# Patient Record
Sex: MALE | Race: WHITE | NOT HISPANIC OR LATINO | Employment: OTHER | URBAN - NONMETROPOLITAN AREA
[De-identification: names, ages, dates, MRNs, and addresses within clinical notes are randomized per-mention and may not be internally consistent; named-entity substitution may affect disease eponyms.]

---

## 2022-01-01 ENCOUNTER — HOSPITAL ENCOUNTER (EMERGENCY)
Facility: HOSPITAL | Age: 77
Discharge: HOME | End: 2022-11-28
Attending: NURSE PRACTITIONER | Admitting: NURSE PRACTITIONER
Payer: COMMERCIAL

## 2022-01-01 VITALS
HEART RATE: 70 BPM | OXYGEN SATURATION: 97 % | BODY MASS INDEX: 23.72 KG/M2 | SYSTOLIC BLOOD PRESSURE: 129 MMHG | RESPIRATION RATE: 16 BRPM | WEIGHT: 165.34 LBS | TEMPERATURE: 98 F | DIASTOLIC BLOOD PRESSURE: 78 MMHG

## 2022-01-01 DIAGNOSIS — R07.81 RIB PAIN ON LEFT SIDE: ICD-10-CM

## 2022-01-01 DIAGNOSIS — N30.01 ACUTE CYSTITIS WITH HEMATURIA: Primary | ICD-10-CM

## 2022-01-01 LAB
ALBUMIN UR-MCNC: NEGATIVE MG/DL
APPEARANCE UR: ABNORMAL
BACTERIA #/AREA URNS HPF: ABNORMAL /HPF
BACTERIA UR CULT: ABNORMAL
BACTERIA UR CULT: ABNORMAL
BILIRUB UR QL STRIP: NEGATIVE
COLOR UR AUTO: ABNORMAL
GLUCOSE UR STRIP-MCNC: NEGATIVE MG/DL
HGB UR QL STRIP: ABNORMAL
KETONES UR STRIP-MCNC: NEGATIVE MG/DL
LEUKOCYTE ESTERASE UR QL STRIP: ABNORMAL
MUCOUS THREADS #/AREA URNS LPF: PRESENT /LPF
NITRATE UR QL: NEGATIVE
PH UR STRIP: 5.5 [PH] (ref 4.7–8)
RBC URINE: 9 /HPF
SP GR UR STRIP: 1.01 (ref 1–1.03)
SQUAMOUS EPITHELIAL: 0 /HPF
UROBILINOGEN UR STRIP-MCNC: NORMAL MG/DL
WBC CLUMPS #/AREA URNS HPF: PRESENT /HPF
WBC URINE: >182 /HPF

## 2022-01-01 PROCEDURE — 81001 URINALYSIS AUTO W/SCOPE: CPT | Performed by: FAMILY MEDICINE

## 2022-01-01 PROCEDURE — 81001 URINALYSIS AUTO W/SCOPE: CPT | Performed by: NURSE PRACTITIONER

## 2022-01-01 PROCEDURE — G0463 HOSPITAL OUTPT CLINIC VISIT: HCPCS

## 2022-01-01 PROCEDURE — 99213 OFFICE O/P EST LOW 20 MIN: CPT | Performed by: NURSE PRACTITIONER

## 2022-01-01 PROCEDURE — 87086 URINE CULTURE/COLONY COUNT: CPT | Performed by: NURSE PRACTITIONER

## 2022-01-01 RX ORDER — CEFDINIR 300 MG/1
300 CAPSULE ORAL 2 TIMES DAILY
Qty: 20 CAPSULE | Refills: 0 | Status: SHIPPED | OUTPATIENT
Start: 2022-01-01 | End: 2022-01-01

## 2022-01-01 ASSESSMENT — ENCOUNTER SYMPTOMS
ABDOMINAL PAIN: 0
MYALGIAS: 1
VOMITING: 0
DIARRHEA: 0
DYSURIA: 1
FLANK PAIN: 0
FEVER: 0
SHORTNESS OF BREATH: 0
CHILLS: 0
HEMATURIA: 0
NAUSEA: 0

## 2022-04-07 ENCOUNTER — HOSPITAL ENCOUNTER (EMERGENCY)
Facility: HOSPITAL | Age: 77
Discharge: HOME | End: 2022-04-07
Attending: NURSE PRACTITIONER | Admitting: NURSE PRACTITIONER
Payer: COMMERCIAL

## 2022-04-07 VITALS
SYSTOLIC BLOOD PRESSURE: 125 MMHG | TEMPERATURE: 97.1 F | OXYGEN SATURATION: 98 % | HEART RATE: 64 BPM | RESPIRATION RATE: 14 BRPM | DIASTOLIC BLOOD PRESSURE: 91 MMHG

## 2022-04-07 DIAGNOSIS — M54.9 UPPER BACK PAIN: ICD-10-CM

## 2022-04-07 DIAGNOSIS — R39.12 WEAK URINARY STREAM: ICD-10-CM

## 2022-04-07 LAB
ALBUMIN SERPL-MCNC: 3.6 G/DL (ref 3.4–5)
ALBUMIN UR-MCNC: NEGATIVE MG/DL
ALP SERPL-CCNC: 124 U/L (ref 40–150)
ALT SERPL W P-5'-P-CCNC: 32 U/L (ref 0–70)
ANION GAP SERPL CALCULATED.3IONS-SCNC: 1 MMOL/L (ref 3–14)
APPEARANCE UR: CLEAR
AST SERPL W P-5'-P-CCNC: 21 U/L (ref 0–45)
BASOPHILS # BLD AUTO: 0 10E3/UL (ref 0–0.2)
BASOPHILS NFR BLD AUTO: 1 %
BILIRUB DIRECT SERPL-MCNC: 0.2 MG/DL (ref 0–0.2)
BILIRUB SERPL-MCNC: 0.6 MG/DL (ref 0.2–1.3)
BILIRUB UR QL STRIP: NEGATIVE
BUN SERPL-MCNC: 14 MG/DL (ref 7–30)
CALCIUM SERPL-MCNC: 10.2 MG/DL (ref 8.5–10.1)
CHLORIDE BLD-SCNC: 108 MMOL/L (ref 94–109)
CO2 SERPL-SCNC: 30 MMOL/L (ref 20–32)
COLOR UR AUTO: NORMAL
CREAT SERPL-MCNC: 0.73 MG/DL (ref 0.66–1.25)
EOSINOPHIL # BLD AUTO: 0.1 10E3/UL (ref 0–0.7)
EOSINOPHIL NFR BLD AUTO: 2 %
ERYTHROCYTE [DISTWIDTH] IN BLOOD BY AUTOMATED COUNT: 14.3 % (ref 10–15)
GFR SERPL CREATININE-BSD FRML MDRD: >90 ML/MIN/1.73M2
GLUCOSE BLD-MCNC: 109 MG/DL (ref 70–99)
GLUCOSE UR STRIP-MCNC: NEGATIVE MG/DL
HCT VFR BLD AUTO: 46.5 % (ref 40–53)
HGB BLD-MCNC: 15.4 G/DL (ref 13.3–17.7)
HGB UR QL STRIP: NEGATIVE
HOLD SPECIMEN: NORMAL
IMM GRANULOCYTES # BLD: 0 10E3/UL
IMM GRANULOCYTES NFR BLD: 0 %
KETONES UR STRIP-MCNC: NEGATIVE MG/DL
LEUKOCYTE ESTERASE UR QL STRIP: NEGATIVE
LYMPHOCYTES # BLD AUTO: 1.8 10E3/UL (ref 0.8–5.3)
LYMPHOCYTES NFR BLD AUTO: 34 %
MCH RBC QN AUTO: 31 PG (ref 26.5–33)
MCHC RBC AUTO-ENTMCNC: 33.1 G/DL (ref 31.5–36.5)
MCV RBC AUTO: 94 FL (ref 78–100)
MONOCYTES # BLD AUTO: 0.5 10E3/UL (ref 0–1.3)
MONOCYTES NFR BLD AUTO: 10 %
NEUTROPHILS # BLD AUTO: 2.8 10E3/UL (ref 1.6–8.3)
NEUTROPHILS NFR BLD AUTO: 53 %
NITRATE UR QL: NEGATIVE
NRBC # BLD AUTO: 0 10E3/UL
NRBC BLD AUTO-RTO: 0 /100
PH UR STRIP: 7 [PH] (ref 4.7–8)
PLATELET # BLD AUTO: 137 10E3/UL (ref 150–450)
POTASSIUM BLD-SCNC: 4.5 MMOL/L (ref 3.4–5.3)
PROT SERPL-MCNC: 7.2 G/DL (ref 6.8–8.8)
RBC # BLD AUTO: 4.97 10E6/UL (ref 4.4–5.9)
RBC URINE: 0 /HPF
SODIUM SERPL-SCNC: 139 MMOL/L (ref 133–144)
SP GR UR STRIP: 1.01 (ref 1–1.03)
SQUAMOUS EPITHELIAL: 0 /HPF
TROPONIN I SERPL HS-MCNC: 10 NG/L
UROBILINOGEN UR STRIP-MCNC: NORMAL MG/DL
WBC # BLD AUTO: 5.3 10E3/UL (ref 4–11)
WBC URINE: 1 /HPF

## 2022-04-07 PROCEDURE — 99284 EMERGENCY DEPT VISIT MOD MDM: CPT

## 2022-04-07 PROCEDURE — 84153 ASSAY OF PSA TOTAL: CPT | Performed by: NURSE PRACTITIONER

## 2022-04-07 PROCEDURE — 99284 EMERGENCY DEPT VISIT MOD MDM: CPT | Performed by: NURSE PRACTITIONER

## 2022-04-07 PROCEDURE — 36415 COLL VENOUS BLD VENIPUNCTURE: CPT | Performed by: STUDENT IN AN ORGANIZED HEALTH CARE EDUCATION/TRAINING PROGRAM

## 2022-04-07 PROCEDURE — 84484 ASSAY OF TROPONIN QUANT: CPT | Performed by: NURSE PRACTITIONER

## 2022-04-07 PROCEDURE — 82310 ASSAY OF CALCIUM: CPT | Performed by: STUDENT IN AN ORGANIZED HEALTH CARE EDUCATION/TRAINING PROGRAM

## 2022-04-07 PROCEDURE — 85004 AUTOMATED DIFF WBC COUNT: CPT | Performed by: STUDENT IN AN ORGANIZED HEALTH CARE EDUCATION/TRAINING PROGRAM

## 2022-04-07 PROCEDURE — 82248 BILIRUBIN DIRECT: CPT | Performed by: STUDENT IN AN ORGANIZED HEALTH CARE EDUCATION/TRAINING PROGRAM

## 2022-04-07 PROCEDURE — 93010 ELECTROCARDIOGRAM REPORT: CPT | Performed by: INTERNAL MEDICINE

## 2022-04-07 PROCEDURE — 93005 ELECTROCARDIOGRAM TRACING: CPT

## 2022-04-07 PROCEDURE — 81001 URINALYSIS AUTO W/SCOPE: CPT | Performed by: STUDENT IN AN ORGANIZED HEALTH CARE EDUCATION/TRAINING PROGRAM

## 2022-04-07 ASSESSMENT — ENCOUNTER SYMPTOMS
ENDOCRINE NEGATIVE: 1
MUSCULOSKELETAL NEGATIVE: 1
EYES NEGATIVE: 1
HEMATOLOGIC/LYMPHATIC NEGATIVE: 1
ABDOMINAL PAIN: 1
NEUROLOGICAL NEGATIVE: 1
DIFFICULTY URINATING: 1
FREQUENCY: 1
ALLERGIC/IMMUNOLOGIC NEGATIVE: 1
CONSTITUTIONAL NEGATIVE: 1
PSYCHIATRIC NEGATIVE: 1
RESPIRATORY NEGATIVE: 1
CARDIOVASCULAR NEGATIVE: 1

## 2022-04-07 NOTE — DISCHARGE INSTRUCTIONS
Thank you for choosing [facility] for your healthcare needs today.  For your weak urinary stream, as I discussed your urine is not infectious, you are able to empty your bladder completely as the bladder scanner had 0 residual when you have your tested.  You will need to follow-up with your urologist that you had seen a couple years ago for further evaluation.  You need to make an appointment with a primary care provider to have a yearly exam done and your prostrate checked.  You need to decrease your coffee intake as you tell me that that is all you drink and increase water intake.  For your back pain use Tylenol or ibuprofen.  If your symptoms worsen or he develop any new concerning symptoms please return to ER.  Thank you

## 2022-04-07 NOTE — ED PROVIDER NOTES
"  History     Chief Complaint   Patient presents with     Abdominal Pain     HPI   History of presenting illness given by patient.    Sarbjit Rivera is a 77 year old male who presents to emergency room for evaluation of difficulty urinating, low abdominal pain, high blood pressure, back pain that is between his shoulder blades.  His back pain that is located in between the shoulder blades started 2 days ago.  His bladder urinary problems have been ongoing for over 2 years and his urologist at that time had told him he just has a \"sleepy bladder\" and placed him on some pills, but after short while he threw the pills away as he did not like being on them.  He decided to be seen for this today because his blood pressure is never this high and he feels that the back pain, the blood pressure, and the urinary difficulty is all related.  He rates his back pain and lower abdominal pressure of 5/10.    Allergies:  Allergies   Allergen Reactions     Statins [Hmg-Coa-R Inhibitors]      Tylenol [Acetaminophen]        Problem List:    There are no problems to display for this patient.       Past Medical History:    No past medical history on file.    Past Surgical History:    No past surgical history on file.    Family History:    No family history on file.    Social History:  Marital Status:  Single [1]  Social History     Tobacco Use     Smoking status: Current Some Day Smoker   Substance Use Topics     Alcohol use: Yes     Comment: occasional     Drug use: No        Medications:    AMLODIPINE BESYLATE PO  aspirin 81 MG tablet  colchicine 0.6 MG tablet  ketorolac (TORADOL) 10 MG tablet  METOPROLOL TARTRATE PO  Rosuvastatin Calcium (CRESTOR PO)          Review of Systems   Constitutional: Negative.    HENT: Negative.    Eyes: Negative.    Respiratory: Negative.    Cardiovascular: Negative.    Gastrointestinal: Positive for abdominal pain.   Endocrine: Negative.    Genitourinary: Positive for difficulty urinating, frequency " and urgency.   Musculoskeletal: Negative.    Skin: Negative.    Allergic/Immunologic: Negative.    Neurological: Negative.    Hematological: Negative.    Psychiatric/Behavioral: Negative.        Physical Exam   BP: 151/90  Pulse: 74  Temp: 97.1  F (36.2  C)  Resp: 14  SpO2: 97 %      Physical Exam  Vitals and nursing note reviewed.   Constitutional:       Appearance: He is well-developed and normal weight.   HENT:      Head: Normocephalic.      Mouth/Throat:      Mouth: Mucous membranes are moist.      Pharynx: Oropharynx is clear.   Eyes:      Extraocular Movements: Extraocular movements intact.      Pupils: Pupils are equal, round, and reactive to light.   Cardiovascular:      Rate and Rhythm: Normal rate and regular rhythm.      Heart sounds: Normal heart sounds.   Pulmonary:      Effort: Pulmonary effort is normal.      Breath sounds: Normal breath sounds.   Abdominal:      General: Abdomen is flat. Bowel sounds are normal.      Palpations: Abdomen is soft.      Tenderness: There is abdominal tenderness in the suprapubic area.   Skin:     General: Skin is warm and dry.   Neurological:      General: No focal deficit present.      Mental Status: He is alert and oriented to person, place, and time.   Psychiatric:         Mood and Affect: Mood normal.         Behavior: Behavior normal.         ED Course     MDM: Differential diagnosis includes and was not limited to: Acute coronary syndrome, abdominal aortic aneurysm, acute cystitis, pyelonephritis, urethritis, prostatitis, nephrolithiasis, infected nephrolithiasis, epididymitis.       EKG Interpretation:      Interpreted by DESIREE Wayne CNP  Time reviewed: 14:30  Symptoms at time of EKG: back pain  Rhythm: sinus bradycardia  Rate: 59  Axis: Left axis deviation  Ectopy: none  Conduction: normal  ST Segments/ T Waves: No ST-T wave changes  Q Waves: none  Comparison to prior: UNCHANGED    Clinical Impression: normal EKG        No results found for this or any  previous visit (from the past 24 hour(s)).    Medications - No data to display    Assessments & Plan (with Medical Decision Making)   Findings as above.  On assessment patient is negative for abdominal pain or tenderness with palpation to all 4 quadrants.  Normal bowel sounds in all 4 quadrants.  Negative CVA tenderness.  Lungs are clear throughout.  Patient denies worsening back pain with deep inspiration.    Labs: CBC, CMP, troponin, and UA are completely normal, so I do not suspect acute cystitis, nephrolithiasis, or infected nephrolithiasis, acute coronary syndrome, abdomen abdominal aortic aneurysm, pyelonephritis, urethritis, prostatitis, or epididymitis.     Discharge plan: I reviewed all lab results with patient and that everything was normal.  I discussed his PSA and that he needs to follow-up with his primary care provider or his urologist that had recently placed him on medications for his bladder difficulties.  He will follow-up with his primary care provider.  He has been instructed if his symptoms worsen or he develops any new concerning symptoms he will return.          I have reviewed the nursing notes.    I have reviewed the findings, diagnosis, plan and need for follow up with the patient.      Discharge Medication List as of 4/7/2022  3:08 PM          Final diagnoses:   Weak urinary stream   Upper back pain       4/7/2022   [facility]     Divina Page APRN CNP  04/17/22 [id number]

## 2022-04-07 NOTE — ED TRIAGE NOTES
Pt presents with c/o difficulty urinating, abd pain and htn.  Denies blurry vision, headache, denies CP, SOB. Pt reports foul smelling urine, denies haematuria.

## 2022-04-11 LAB
PSA FREE MFR SERPL: 24 %
PSA FREE SERPL-MCNC: 9.3 NG/ML
PSA SERPL IA-MCNC: 39 NG/ML

## 2022-06-03 ENCOUNTER — HOSPITAL ENCOUNTER (EMERGENCY)
Facility: HOSPITAL | Age: 77
Discharge: HOME | End: 2022-06-03
Attending: STUDENT IN AN ORGANIZED HEALTH CARE EDUCATION/TRAINING PROGRAM | Admitting: STUDENT IN AN ORGANIZED HEALTH CARE EDUCATION/TRAINING PROGRAM
Payer: COMMERCIAL

## 2022-06-03 VITALS
OXYGEN SATURATION: 96 % | BODY MASS INDEX: 22.9 KG/M2 | HEIGHT: 70 IN | TEMPERATURE: 97.7 F | RESPIRATION RATE: 16 BRPM | SYSTOLIC BLOOD PRESSURE: 143 MMHG | HEART RATE: 89 BPM | WEIGHT: 160 LBS | DIASTOLIC BLOOD PRESSURE: 93 MMHG

## 2022-06-03 DIAGNOSIS — N39.0 URINARY TRACT INFECTION WITHOUT HEMATURIA, SITE UNSPECIFIED: ICD-10-CM

## 2022-06-03 DIAGNOSIS — R33.9 URINARY RETENTION: ICD-10-CM

## 2022-06-03 PROBLEM — I20.0 UNSTABLE ANGINA (H): Status: ACTIVE | Noted: 2018-09-21

## 2022-06-03 PROBLEM — I25.10 CORONARY ARTERY DISEASE INVOLVING NATIVE CORONARY ARTERY OF NATIVE HEART WITHOUT ANGINA PECTORIS: Status: ACTIVE | Noted: 2017-03-31

## 2022-06-03 PROBLEM — J06.9 VIRAL URI WITH COUGH: Status: ACTIVE | Noted: 2018-09-22

## 2022-06-03 PROBLEM — I16.0 HYPERTENSIVE URGENCY: Status: ACTIVE | Noted: 2018-09-22

## 2022-06-03 PROBLEM — I72.3 ANEURYSM, COMMON ILIAC ARTERY (H): Status: ACTIVE | Noted: 2017-03-14

## 2022-06-03 PROBLEM — I48.0 PAROXYSMAL ATRIAL FIBRILLATION (H): Status: ACTIVE | Noted: 2017-05-03

## 2022-06-03 PROBLEM — R00.2 PALPITATIONS: Status: ACTIVE | Noted: 2017-03-14

## 2022-06-03 PROBLEM — R55 VASOVAGAL REACTION: Status: ACTIVE | Noted: 2017-03-14

## 2022-06-03 PROBLEM — I25.10 ASCVD (ARTERIOSCLEROTIC CARDIOVASCULAR DISEASE): Status: ACTIVE | Noted: 2019-01-08

## 2022-06-03 LAB
ALBUMIN UR-MCNC: 20 MG/DL
APPEARANCE UR: ABNORMAL
BACTERIA #/AREA URNS HPF: ABNORMAL /HPF
BILIRUB UR QL STRIP: NEGATIVE
COLOR UR AUTO: ABNORMAL
GLUCOSE UR STRIP-MCNC: NEGATIVE MG/DL
HGB UR QL STRIP: ABNORMAL
HYALINE CASTS: 4 /LPF
KETONES UR STRIP-MCNC: NEGATIVE MG/DL
LEUKOCYTE ESTERASE UR QL STRIP: ABNORMAL
MUCOUS THREADS #/AREA URNS LPF: PRESENT /LPF
NITRATE UR QL: POSITIVE
PH UR STRIP: 5.5 [PH] (ref 4.7–8)
RBC URINE: 13 /HPF
SP GR UR STRIP: 1.01 (ref 1–1.03)
SQUAMOUS EPITHELIAL: 0 /HPF
UROBILINOGEN UR STRIP-MCNC: NORMAL MG/DL
WBC URINE: 46 /HPF

## 2022-06-03 PROCEDURE — 81001 URINALYSIS AUTO W/SCOPE: CPT | Performed by: STUDENT IN AN ORGANIZED HEALTH CARE EDUCATION/TRAINING PROGRAM

## 2022-06-03 PROCEDURE — 87086 URINE CULTURE/COLONY COUNT: CPT | Performed by: STUDENT IN AN ORGANIZED HEALTH CARE EDUCATION/TRAINING PROGRAM

## 2022-06-03 PROCEDURE — 99283 EMERGENCY DEPT VISIT LOW MDM: CPT | Performed by: STUDENT IN AN ORGANIZED HEALTH CARE EDUCATION/TRAINING PROGRAM

## 2022-06-03 PROCEDURE — 99283 EMERGENCY DEPT VISIT LOW MDM: CPT

## 2022-06-03 RX ORDER — AMLODIPINE AND BENAZEPRIL HYDROCHLORIDE 10; 20 MG/1; MG/1
1 CAPSULE ORAL DAILY
COMMUNITY
Start: 2021-08-12

## 2022-06-03 RX ORDER — SOTALOL HYDROCHLORIDE 80 MG/1
80 TABLET ORAL
COMMUNITY
Start: 2021-08-12

## 2022-06-03 RX ORDER — NITROGLYCERIN 0.4 MG/1
0.4 TABLET SUBLINGUAL
COMMUNITY
Start: 2021-08-12

## 2022-06-03 RX ORDER — ATORVASTATIN CALCIUM 20 MG/1
20 TABLET, FILM COATED ORAL DAILY
COMMUNITY
Start: 2021-08-12

## 2022-06-03 RX ORDER — CALCIUM CARBONATE 300MG(750)
400 TABLET,CHEWABLE ORAL
COMMUNITY
Start: 2021-08-12 | End: 2023-01-01

## 2022-06-03 RX ORDER — CEPHALEXIN 500 MG/1
500 CAPSULE ORAL 3 TIMES DAILY
Qty: 21 CAPSULE | Refills: 0 | Status: SHIPPED | OUTPATIENT
Start: 2022-06-03 | End: 2022-06-10

## 2022-06-03 NOTE — ED TRIAGE NOTES
Patient ambulatory to ED room 2. Patient reports that he had a gallagher catheter removed yesterday in clinic. Patient was able to void initially, but has been unable to void since about 2100 last night. Patient reports bladder pressure/discomfort. Patient first had gallagher placed on 5/28/22 at the [facility].

## 2022-06-03 NOTE — DISCHARGE INSTRUCTIONS
- Please return to the Emergency Room if you do not improve, feel worse, or have any new or concerning symptoms. We would especially want to see you back if you experience a fever.  - Please follow up with a urologist by phone within 12 hours and in person within 7-10 days.

## 2022-06-03 NOTE — ED PROVIDER NOTES
History     Chief Complaint   Patient presents with     Urinary Retention     Had catheter removed yesterday, unable to void since 2100 last night     HPI  Sarbjit Rivera is a 77 year old male with PMH urinary retention s/p gallagher removal yesterday, now unable to urinate. Has hx of GERD as well. Feels some fullness in the lower abdomen but no pain. No fevers. Had gallagher in for around 4-5 days and taken out by urologist yesterday. No N/V/D. No CP/SOB.    Allergies:  Allergies   Allergen Reactions     Oxybutynin Swelling     Tongue swelling     Statins [Hmg-Coa-R Inhibitors]      Tylenol [Acetaminophen]        Problem List:    Patient Active Problem List    Diagnosis Date Noted     ASCVD (arteriosclerotic cardiovascular disease) 01/08/2019     Priority: Medium     Hypertensive urgency 09/22/2018     Priority: Medium     Viral URI with cough 09/22/2018     Priority: Medium     Unstable angina (H) 09/21/2018     Priority: Medium     Paroxysmal atrial fibrillation (H) 05/03/2017     Priority: Medium     Coronary artery disease involving native coronary artery of native heart without angina pectoris 03/31/2017     Priority: Medium     Aneurysm, common iliac artery (H) 03/14/2017     Priority: Medium     Palpitations 03/14/2017     Priority: Medium     Vasovagal reaction 03/14/2017     Priority: Medium     Jaw pain 09/21/2014     Priority: Medium     Actinic keratosis 01/23/2014     Priority: Medium     Basal cell carcinoma of eyelid 01/23/2014     Priority: Medium     Formatting of this note might be different from the original.  IMO Update       Personal history of other malignant neoplasm of skin 01/23/2014     Priority: Medium     Anxiety 11/24/2012     Priority: Medium     Chest pain 11/24/2012     Priority: Medium     GERD (gastroesophageal reflux disease) 11/24/2012     Priority: Medium     HTN (hypertension) 11/24/2012     Priority: Medium     Pancreatic cyst 11/24/2012     Priority: Medium     Pain in joint,  "shoulder region 06/02/2009     Priority: Medium     Formatting of this note might be different from the original.  IMO Update 10/11          Past Medical History:    History reviewed. No pertinent past medical history.    Past Surgical History:    History reviewed. No pertinent surgical history.    Family History:    History reviewed. No pertinent family history.    Social History:  Marital Status:  Single [1]  Social History     Tobacco Use     Smoking status: Current Some Day Smoker   Substance Use Topics     Alcohol use: Yes     Comment: occasional     Drug use: No        Medications:    AMLODIPINE BESYLATE PO  amLODIPine-benazepril (LOTREL) 10-20 MG capsule  aspirin 81 MG tablet  atorvastatin (LIPITOR) 20 MG tablet  cephALEXin (KEFLEX) 500 MG capsule  Magnesium 400 MG TABS  nitroGLYcerin (NITROSTAT) 0.4 MG sublingual tablet  sotalol (BETAPACE) 80 MG tablet  ketorolac (TORADOL) 10 MG tablet  Rosuvastatin Calcium (CRESTOR PO)          Review of Systems   All other systems reviewed and are negative.      Physical Exam   BP: 143/93  Pulse: 89  Temp: 97.7  F (36.5  C)  Resp: 16  Height: 177.8 cm (5' 10\")  Weight: 72.6 kg (160 lb)  SpO2: 96 %      Physical Exam  Vitals and nursing note reviewed.   Constitutional:       General: He is not in acute distress.     Appearance: Normal appearance. He is not ill-appearing or toxic-appearing.   HENT:      Head: Normocephalic and atraumatic.      Right Ear: External ear normal.      Left Ear: External ear normal.   Eyes:      Pupils: Pupils are equal, round, and reactive to light.   Cardiovascular:      Rate and Rhythm: Normal rate and regular rhythm.      Pulses: Normal pulses.      Heart sounds: Normal heart sounds.   Pulmonary:      Effort: Pulmonary effort is normal.   Abdominal:      General: Abdomen is flat.      Palpations: Abdomen is soft.      Tenderness: There is no abdominal tenderness. There is no guarding.   Musculoskeletal:         General: No swelling or " tenderness. Normal range of motion.      Cervical back: Normal range of motion and neck supple.   Skin:     General: Skin is warm and dry.      Capillary Refill: Capillary refill takes less than 2 seconds.   Neurological:      General: No focal deficit present.      Mental Status: He is alert and oriented to person, place, and time.   Psychiatric:         Mood and Affect: Mood normal.         Behavior: Behavior normal.         ED Course        ED Course as of 06/03/22 0727 Fri Jun 03, 2022   0724 UA with Microscopic reflex to Culture(!)  Consistent with UTI. Will treat.     Findings were discussed with the patient. Additional verbal instructions were discussed with the patient as well. Instructed to follow up with a primary care provider within urology. Also discussed specific warning signs and instructed to return to the ED if there are any concerns. Patient voiced understanding of instructions, questions were answered and the patient was discharged home in stable condition.    Procedures              Results for orders placed or performed during the hospital encounter of 06/03/22 (from the past 24 hour(s))   UA with Microscopic reflex to Culture    Specimen: Urine, Catheter   Result Value Ref Range    Color Urine Light Yellow Colorless, Straw, Light Yellow, Yellow    Appearance Urine Slightly Cloudy (A) Clear    Glucose Urine Negative Negative mg/dL    Bilirubin Urine Negative Negative    Ketones Urine Negative Negative mg/dL    Specific Gravity Urine 1.014 1.003 - 1.035    Blood Urine Small (A) Negative    pH Urine 5.5 4.7 - 8.0    Protein Albumin Urine 20  (A) Negative mg/dL    Urobilinogen Urine Normal Normal, 2.0 mg/dL    Nitrite Urine Positive (A) Negative    Leukocyte Esterase Urine Moderate (A) Negative    Bacteria Urine Many (A) None Seen /HPF    Mucus Urine Present (A) None Seen /LPF    RBC Urine 13 (H) <=2 /HPF    WBC Urine 46 (H) <=5 /HPF    Squamous Epithelials Urine 0 <=1 /HPF    Hyaline Casts Urine  4 (H) <=2 /LPF    Narrative    Urine Culture ordered based on laboratory criteria       Medications - No data to display    Assessments & Plan (with Medical Decision Making)     I have reviewed the nursing notes.    Urinary retention since gallagher removal yesterday. Has not been able to void. Bladder scan shows high amount of urine in bladder (approx 560). Will plan on repeat gallagher. No indication for renal function testing. Will check UA out of abundance of caution but low suspicion for UTI.    See ED Course.    I have reviewed the findings, diagnosis, plan and need for follow up with the patient.    New Prescriptions    CEPHALEXIN (KEFLEX) 500 MG CAPSULE    Take 1 capsule (500 mg) by mouth 3 times daily for 7 days       Final diagnoses:   Urinary retention   Urinary tract infection without hematuria, site unspecified       6/3/2022   [facility]     Ulices Wall MD  06/03/22 0349

## 2022-06-03 NOTE — ED NOTES
Discharge instructions reviewed with patient.  rx e-scribed to pharmacy of choice.  No questions or concerns.

## 2022-06-05 LAB — BACTERIA UR CULT: ABNORMAL

## 2022-08-01 ENCOUNTER — HOSPITAL ENCOUNTER (EMERGENCY)
Facility: HOSPITAL | Age: 77
Discharge: HOME | End: 2022-08-01
Attending: NURSE PRACTITIONER | Admitting: NURSE PRACTITIONER
Payer: COMMERCIAL

## 2022-08-01 VITALS
OXYGEN SATURATION: 97 % | DIASTOLIC BLOOD PRESSURE: 73 MMHG | TEMPERATURE: 97 F | HEART RATE: 60 BPM | RESPIRATION RATE: 16 BRPM | SYSTOLIC BLOOD PRESSURE: 120 MMHG

## 2022-08-01 DIAGNOSIS — Z46.6 URINARY CATHETER CHANGE REQUIRED: ICD-10-CM

## 2022-08-01 PROCEDURE — 99213 OFFICE O/P EST LOW 20 MIN: CPT | Performed by: NURSE PRACTITIONER

## 2022-08-01 PROCEDURE — G0463 HOSPITAL OUTPT CLINIC VISIT: HCPCS | Mod: 25

## 2022-08-01 PROCEDURE — G0463 HOSPITAL OUTPT CLINIC VISIT: HCPCS

## 2022-08-01 RX ORDER — LIDOCAINE HYDROCHLORIDE 20 MG/ML
JELLY TOPICAL ONCE
Status: DISCONTINUED | OUTPATIENT
Start: 2022-08-01 | End: 2022-08-01 | Stop reason: HOSPADM

## 2022-08-01 RX ORDER — ALPRAZOLAM 0.25 MG
0.25 TABLET ORAL 3 TIMES DAILY PRN
Qty: 12 TABLET | Refills: 0 | Status: SHIPPED | OUTPATIENT
Start: 2022-08-01 | End: 2022-08-01

## 2022-08-01 RX ORDER — ALPRAZOLAM 0.25 MG
0.25 TABLET ORAL 2 TIMES DAILY PRN
Qty: 12 TABLET | Refills: 0 | Status: SHIPPED | OUTPATIENT
Start: 2022-08-01 | End: 2022-08-01

## 2022-08-01 RX ORDER — ALPRAZOLAM 0.25 MG
0.25 TABLET ORAL 3 TIMES DAILY PRN
Qty: 12 TABLET | Refills: 0 | Status: SHIPPED | OUTPATIENT
Start: 2022-08-01 | End: 2023-01-01

## 2022-08-01 ASSESSMENT — ENCOUNTER SYMPTOMS
MYALGIAS: 1
PSYCHIATRIC NEGATIVE: 1
ARTHRALGIAS: 1
NEUROLOGICAL NEGATIVE: 1
HEMATURIA: 0

## 2022-08-01 NOTE — ED NOTES
16 [place] Hammer inserted without difficulty. 150cc pink urine emptied from old catheter leg bag.

## 2022-08-01 NOTE — ED TRIAGE NOTES
Pt presents with no urine in his leg catheter bag. States that he has been having bladder spasms today. States that he did not take his aspirin today because is going to have surgery on 8-.

## 2022-08-01 NOTE — DISCHARGE INSTRUCTIONS
New 16 Malay catheter inserted.   Drink plenty of fluids  Xanax 0.25 tab up to 2 times a day for spasm in bladder.

## 2022-08-01 NOTE — ED PROVIDER NOTES
History     Chief Complaint   Patient presents with     Catheter Problem     HPI  Sarbjit Rivera is a 77 year old male who has gallagher cath in  Has not been draining fully, so comes in for catheter change. Hx prostate cancer and has had some bladder spasm with the cath in,.  Off ASA, Has surgery on 8/12/22 (TURP)       Allergies:  Allergies   Allergen Reactions     Oxybutynin Swelling     Tongue swelling     Statins [Hmg-Coa-R Inhibitors]      Tylenol [Acetaminophen]        Problem List:    Patient Active Problem List    Diagnosis Date Noted     ASCVD (arteriosclerotic cardiovascular disease) 01/08/2019     Priority: Medium     Hypertensive urgency 09/22/2018     Priority: Medium     Viral URI with cough 09/22/2018     Priority: Medium     Unstable angina (H) 09/21/2018     Priority: Medium     Paroxysmal atrial fibrillation (H) 05/03/2017     Priority: Medium     Coronary artery disease involving native coronary artery of native heart without angina pectoris 03/31/2017     Priority: Medium     Aneurysm, common iliac artery (H) 03/14/2017     Priority: Medium     Palpitations 03/14/2017     Priority: Medium     Vasovagal reaction 03/14/2017     Priority: Medium     Jaw pain 09/21/2014     Priority: Medium     Actinic keratosis 01/23/2014     Priority: Medium     Basal cell carcinoma of eyelid 01/23/2014     Priority: Medium     Formatting of this note might be different from the original.  IMO Update       Personal history of other malignant neoplasm of skin 01/23/2014     Priority: Medium     Anxiety 11/24/2012     Priority: Medium     Chest pain 11/24/2012     Priority: Medium     GERD (gastroesophageal reflux disease) 11/24/2012     Priority: Medium     HTN (hypertension) 11/24/2012     Priority: Medium     Pancreatic cyst 11/24/2012     Priority: Medium     Pain in joint, shoulder region 06/02/2009     Priority: Medium     Formatting of this note might be different from the original.  IMO Update 10/11           Past Medical History:    No past medical history on file.    Past Surgical History:    No past surgical history on file.    Family History:    No family history on file.    Social History:  Marital Status:  Single [1]  Social History     Tobacco Use     Smoking status: Current Some Day Smoker   Substance Use Topics     Alcohol use: Yes     Comment: occasional     Drug use: No        Medications:    ALPRAZolam (XANAX) 0.25 MG tablet  AMLODIPINE BESYLATE PO  amLODIPine-benazepril (LOTREL) 10-20 MG capsule  aspirin 81 MG tablet  atorvastatin (LIPITOR) 20 MG tablet  Magnesium 400 MG TABS  nitroGLYcerin (NITROSTAT) 0.4 MG sublingual tablet  Rosuvastatin Calcium (CRESTOR PO)  sotalol (BETAPACE) 80 MG tablet          Review of Systems   Genitourinary: Positive for penile pain. Negative for hematuria and penile swelling.        Has catheter that is not draining well     Musculoskeletal: Positive for arthralgias and myalgias.   Skin: Negative.    Neurological: Negative.    Psychiatric/Behavioral: Negative.        Physical Exam   BP: 120/73  Pulse: 60  Temp: 97  F (36.1  C)  Resp: 16  SpO2: 97 %      Physical Exam    ED Course                 Procedures         Catheter  #16g Hammer   placed with 10cc to balloon placed by staff    (2 way with surgical end used as supplies not readily available Surgical end taped.  )       No results found for this or any previous visit (from the past 24 hour(s)).    Medications   lidocaine (XYLOCAINE) 2 % external gel (has no administration in time range)       Assessments & Plan (with Medical Decision Making)     I have reviewed the nursing notes.    I have reviewed the findings, diagnosis, plan and need for follow up with the patient.      New Prescriptions    ALPRAZOLAM (XANAX) 0.25 MG TABLET    Take 1 tablet (0.25 mg) by mouth 2 times daily as needed for anxiety And bladder spasm       Final diagnoses:   Urinary catheter change required     ASSESSMENT / PLAN:  (Z46.6) Urinary catheter  change required  Comment:   Plan:   1. New 16 Setsw[place] catheter inserted.   2. Drink plenty of fluids  3. Xanax 0.25 tab up to 2 times a day for spasm in bladder.      8/1/2022   [facility]     Mahamed Hilario NP  08/01/22 [id number]

## 2022-08-17 ENCOUNTER — HOSPITAL ENCOUNTER (EMERGENCY)
Facility: HOSPITAL | Age: 77
Discharge: HOME | End: 2022-08-17
Attending: EMERGENCY MEDICINE | Admitting: EMERGENCY MEDICINE
Payer: COMMERCIAL

## 2022-08-17 VITALS — SYSTOLIC BLOOD PRESSURE: 129 MMHG | DIASTOLIC BLOOD PRESSURE: 89 MMHG

## 2022-08-17 DIAGNOSIS — R33.9 URINARY RETENTION: ICD-10-CM

## 2022-08-17 DIAGNOSIS — N30.00 ACUTE CYSTITIS WITHOUT HEMATURIA: ICD-10-CM

## 2022-08-17 LAB
ALBUMIN UR-MCNC: 30 MG/DL
APPEARANCE UR: ABNORMAL
BACTERIA #/AREA URNS HPF: ABNORMAL /HPF
BILIRUB UR QL STRIP: NEGATIVE
COLOR UR AUTO: ABNORMAL
GLUCOSE UR STRIP-MCNC: NEGATIVE MG/DL
HGB UR QL STRIP: ABNORMAL
KETONES UR STRIP-MCNC: NEGATIVE MG/DL
LEUKOCYTE ESTERASE UR QL STRIP: ABNORMAL
MUCOUS THREADS #/AREA URNS LPF: PRESENT /LPF
NITRATE UR QL: POSITIVE
PH UR STRIP: 7 [PH] (ref 4.7–8)
RBC URINE: >182 /HPF
SP GR UR STRIP: 1.01 (ref 1–1.03)
SQUAMOUS EPITHELIAL: 0 /HPF
UROBILINOGEN UR STRIP-MCNC: NORMAL MG/DL
WBC URINE: 13 /HPF

## 2022-08-17 PROCEDURE — 87086 URINE CULTURE/COLONY COUNT: CPT | Performed by: EMERGENCY MEDICINE

## 2022-08-17 PROCEDURE — 99283 EMERGENCY DEPT VISIT LOW MDM: CPT | Performed by: EMERGENCY MEDICINE

## 2022-08-17 PROCEDURE — 81001 URINALYSIS AUTO W/SCOPE: CPT | Performed by: EMERGENCY MEDICINE

## 2022-08-17 PROCEDURE — 51702 INSERT TEMP BLADDER CATH: CPT

## 2022-08-17 PROCEDURE — 250N000009 HC RX 250: Performed by: EMERGENCY MEDICINE

## 2022-08-17 PROCEDURE — 99283 EMERGENCY DEPT VISIT LOW MDM: CPT

## 2022-08-17 RX ORDER — NITROFURANTOIN 25; 75 MG/1; MG/1
100 CAPSULE ORAL 2 TIMES DAILY
Qty: 10 CAPSULE | Refills: 0 | Status: SHIPPED | OUTPATIENT
Start: 2022-08-17 | End: 2022-01-01

## 2022-08-17 RX ORDER — LIDOCAINE HYDROCHLORIDE 20 MG/ML
JELLY TOPICAL ONCE
Status: COMPLETED | OUTPATIENT
Start: 2022-08-17 | End: 2022-08-17

## 2022-08-17 RX ORDER — NITROFURANTOIN 25; 75 MG/1; MG/1
100 CAPSULE ORAL 2 TIMES DAILY
Qty: 10 CAPSULE | Refills: 0 | Status: SHIPPED | OUTPATIENT
Start: 2022-08-17 | End: 2022-08-17

## 2022-08-17 RX ADMIN — LIDOCAINE HYDROCHLORIDE: 20 JELLY TOPICAL at 07:30

## 2022-08-17 ASSESSMENT — ENCOUNTER SYMPTOMS
MUSCULOSKELETAL NEGATIVE: 1
ABDOMINAL PAIN: 1
HEMATOLOGIC/LYMPHATIC NEGATIVE: 1
NEUROLOGICAL NEGATIVE: 1
CARDIOVASCULAR NEGATIVE: 1
CONSTITUTIONAL NEGATIVE: 1
DIFFICULTY URINATING: 1
EYES NEGATIVE: 1
RESPIRATORY NEGATIVE: 1

## 2022-08-17 ASSESSMENT — ACTIVITIES OF DAILY LIVING (ADL): ADLS_ACUITY_SCORE: 33

## 2022-08-17 NOTE — ED PROVIDER NOTES
History     Chief Complaint   Patient presents with     Urinary Retention     HPI  Sarbjit Rivera is a 77 year old male who comes to the emergency department complaining of inability to urinate.  Patient states that he had prostate surgery last week.  He recently had his Hammer catheter removed.  He has not been able to urinate for the past several hours and has increasing a lower abdominal discomfort and pressure up.  He denies fevers or chills.  He denies flank pain.  He denies headache.  He denies pain in his chest and denies feeling short of breath.  He states he is not nauseous and he has not vomited.  He has not had hematuria.    Allergies:  Allergies   Allergen Reactions     Acetaminophen Nausea     Oxybutynin Swelling     Tongue swelling     Pravastatin      Disturbed sleep pattern      Statins [Hmg-Coa-R Inhibitors]        Problem List:    Patient Active Problem List    Diagnosis Date Noted     ASCVD (arteriosclerotic cardiovascular disease) 01/08/2019     Priority: Medium     Hypertensive urgency 09/22/2018     Priority: Medium     Viral URI with cough 09/22/2018     Priority: Medium     Unstable angina (H) 09/21/2018     Priority: Medium     Paroxysmal atrial fibrillation (H) 05/03/2017     Priority: Medium     Coronary artery disease involving native coronary artery of native heart without angina pectoris 03/31/2017     Priority: Medium     Aneurysm, common iliac artery (H) 03/14/2017     Priority: Medium     Palpitations 03/14/2017     Priority: Medium     Vasovagal reaction 03/14/2017     Priority: Medium     Jaw pain 09/21/2014     Priority: Medium     Actinic keratosis 01/23/2014     Priority: Medium     Basal cell carcinoma of eyelid 01/23/2014     Priority: Medium     Formatting of this note might be different from the original.  IMO Update       Personal history of other malignant neoplasm of skin 01/23/2014     Priority: Medium     Anxiety 11/24/2012     Priority: Medium     Chest pain  11/24/2012     Priority: Medium     GERD (gastroesophageal reflux disease) 11/24/2012     Priority: Medium     HTN (hypertension) 11/24/2012     Priority: Medium     Pancreatic cyst 11/24/2012     Priority: Medium     Pain in joint, shoulder region 06/02/2009     Priority: Medium     Formatting of this note might be different from the original.  IMO Update 10/11          Past Medical History:    No past medical history on file.    Past Surgical History:    No past surgical history on file.    Family History:    No family history on file.    Social History:  Marital Status:  Single [1]  Social History     Tobacco Use     Smoking status: Current Some Day Smoker   Substance Use Topics     Alcohol use: Yes     Comment: occasional     Drug use: No        Medications:    nitroFURantoin macrocrystal-monohydrate (MACROBID) 100 MG capsule  ALPRAZolam (XANAX) 0.25 MG tablet  AMLODIPINE BESYLATE PO  amLODIPine-benazepril (LOTREL) 10-20 MG capsule  aspirin 81 MG tablet  atorvastatin (LIPITOR) 20 MG tablet  Magnesium 400 MG TABS  nitroGLYcerin (NITROSTAT) 0.4 MG sublingual tablet  Rosuvastatin Calcium (CRESTOR PO)  sotalol (BETAPACE) 80 MG tablet          Review of Systems   Constitutional: Negative.    HENT: Negative.    Eyes: Negative.    Respiratory: Negative.    Cardiovascular: Negative.    Gastrointestinal: Positive for abdominal pain.   Genitourinary: Positive for decreased urine volume and difficulty urinating.   Musculoskeletal: Negative.    Neurological: Negative.    Hematological: Negative.    All other systems reviewed and are found    Physical Exam   BP: 135/88      Physical Exam 69-year-old gentleman who is awake alert oriented person place and time he is pleasant and cooperative with my exam.  HET normocephalic extract muscles intact pupils equally round and reactive to light and oropharynx clear.  Neck supple full range of motion without pain.  Lungs clear bilaterally.  Heart maintains regular rate and rhythm S1  and S2 sounds appreciated.  Abdomen is soft patient does have moderate voluntary guarding to palpation across his lower abdomen.  No rebound tenderness.  Extremities full range of motion no edema.  Neurologic exam no focal deficit.  Dermatologic exam no diffuse skin rashes or lesions    ED Course              ED Course as of 08/17/22 0827   Wed Aug 17, 2022   0812 Patient remained very stable throughout his stay in the department and wish to have his Hammer removed.  We will remove the Hammer.  I will start antibiotics.  I will advise and recheck by his primary care provider this week but also urged him to return if further problems should occur.   0826 Patient actually agreed to be discharged with a Hammer in place.                         Results for orders placed or performed during the hospital encounter of 08/17/22 (from the past 24 hour(s))   UA with Microscopic reflex to Culture    Specimen: Urine, Catheter   Result Value Ref Range    Color Urine Light Yellow Colorless, Straw, Light Yellow, Yellow    Appearance Urine Slightly Cloudy (A) Clear    Glucose Urine Negative Negative mg/dL    Bilirubin Urine Negative Negative    Ketones Urine Negative Negative mg/dL    Specific Gravity Urine 1.010 1.003 - 1.035    Blood Urine Large (A) Negative    pH Urine 7.0 4.7 - 8.0    Protein Albumin Urine 30  (A) Negative mg/dL    Urobilinogen Urine Normal Normal, 2.0 mg/dL    Nitrite Urine Positive (A) Negative    Leukocyte Esterase Urine Moderate (A) Negative    Bacteria Urine Moderate (A) None Seen /HPF    Mucus Urine Present (A) None Seen /LPF    RBC Urine >182 (H) <=2 /HPF    WBC Urine 13 (H) <=5 /HPF    Squamous Epithelials Urine 0 <=1 /HPF    Narrative    Urine Culture ordered based on laboratory criteria       Medications   lidocaine (XYLOCAINE) 2 % external gel ( Topical Given 8/17/22 0730)       Assessments & Plan (with Medical Decision Making)     I have reviewed the nursing notes.    I have reviewed the findings,  diagnosis, plan and need for follow up with the patient.      New Prescriptions    NITROFURANTOIN MACROCRYSTAL-MONOHYDRATE (MACROBID) 100 MG CAPSULE    Take 1 capsule (100 mg) by mouth 2 times daily for 5 days       Final diagnoses:   Urinary retention   Acute cystitis without hematuria       8/17/2022   [facility]     Sarbjit Harris, DO  08/17/22 0814       Sarbjit Harris, DO  08/17/22 0827

## 2022-08-17 NOTE — ED NOTES
Face to face report given with opportunity to observe patient.    Report given to VINNIE Rivers RN   8/17/2022  7:23 AM

## 2022-08-17 NOTE — ED NOTES
Patient is willing to leave his gallagher in at this time.  He had a total of 780 ml voided out.  Patient tolerated very well-urine remains slightly cloudy but otherwise clear.  Patient is aware he is going to be treated for an UTI and that his RX was E-Scribed to [facility] per his request.  Patient will call is his primary clinic today to set up his follow up appointment for Friday and/or Monday for gallagher removal and urine recheck.  Leg bag with extension tubing was placed and secured per  patient specifications.  Patient encouraged to return if develops fever, worsening bladder discomfort, no or little urine output and/or hematuria.  Patient v/u and has no further questions at this time.  Patient home.

## 2022-08-19 LAB — BACTERIA UR CULT: ABNORMAL

## 2022-11-28 NOTE — ED TRIAGE NOTES
"78 y/o male presents with reports of a recurrent UTI with symptoms of urinary frequency on \"gunk\" in his self cath supplies. He has self cathed for the last 3-4 months due to retention.   Patient also reports left sided rib pain after a fall 6 weeks ago. He did not seek medical care.       "
Pt presents with c/o uti sx    Has to self cath and has gunk on the cath supplies, frequency, burning. Started at the end of September.    Fell about 6 weeks ago, and currently has left sided rib pain, never came in    Took ibu and naprox       
General

## 2022-11-28 NOTE — DISCHARGE INSTRUCTIONS
Take antibiotic as prescribed until finished.     Continue taking ibuprofen as needed for rib pain.     Follow up with your doctor or urologist if no improvement in symptoms.    Return to emergency department for worsening or concerning symptoms.

## 2022-11-28 NOTE — ED PROVIDER NOTES
History     Chief Complaint   Patient presents with     Rule out Urinary Tract Infection     HPI  Sarbjit Rivera is a 77 year old male who presents ambulatory to urgent care for concerns of a urinary tract infection.  Patient tells me he has a history of metastatic prostate cancer.  He did have a indwelling Hammer catheter in for 4 months that was removed.  Now patient does straight caths.  In the last few days he has started noticing a pain with urination as well as a discharge in the catheter tube every time he straight caths.  He does use a new catheter every single day.  He denies any fever or chills.  No abdominal pain or flank pain.  Last treated for urinary tract infection 3 weeks ago with Bactrim.    Additionally, patient tells me that he was walking in the dark at home when he tripped and fell injuring his left ribs.  He has had pain to this area ever since.  He denies any shortness of breath no bruising.  Pain is mostly when he is trying to reposition himself.  He has been taking ibuprofen which seems to help with the pain.    Allergies:  Allergies   Allergen Reactions     Acetaminophen Nausea     Oxybutynin Swelling     Tongue swelling     Pravastatin      Disturbed sleep pattern      Statins [Hmg-Coa-R Inhibitors]        Problem List:    Patient Active Problem List    Diagnosis Date Noted     ASCVD (arteriosclerotic cardiovascular disease) 01/08/2019     Priority: Medium     Hypertensive urgency 09/22/2018     Priority: Medium     Viral URI with cough 09/22/2018     Priority: Medium     Unstable angina (H) 09/21/2018     Priority: Medium     Paroxysmal atrial fibrillation (H) 05/03/2017     Priority: Medium     Coronary artery disease involving native coronary artery of native heart without angina pectoris 03/31/2017     Priority: Medium     Aneurysm, common iliac artery (H) 03/14/2017     Priority: Medium     Palpitations 03/14/2017     Priority: Medium     Vasovagal reaction 03/14/2017     Priority:  Medium     Jaw pain 09/21/2014     Priority: Medium     Actinic keratosis 01/23/2014     Priority: Medium     Basal cell carcinoma of eyelid 01/23/2014     Priority: Medium     Formatting of this note might be different from the original.  IMO Update       Personal history of other malignant neoplasm of skin 01/23/2014     Priority: Medium     Anxiety 11/24/2012     Priority: Medium     Chest pain 11/24/2012     Priority: Medium     GERD (gastroesophageal reflux disease) 11/24/2012     Priority: Medium     HTN (hypertension) 11/24/2012     Priority: Medium     Pancreatic cyst 11/24/2012     Priority: Medium     Pain in joint, shoulder region 06/02/2009     Priority: Medium     Formatting of this note might be different from the original.  IMO Update 10/11          Past Medical History:    History reviewed. No pertinent past medical history.    Past Surgical History:    History reviewed. No pertinent surgical history.    Family History:    History reviewed. No pertinent family history.    Social History:  Marital Status:  Single [1]  Social History     Tobacco Use     Smoking status: Some Days   Substance Use Topics     Alcohol use: Yes     Comment: occasional     Drug use: No        Medications:    ALPRAZolam (XANAX) 0.25 MG tablet  AMLODIPINE BESYLATE PO  amLODIPine-benazepril (LOTREL) 10-20 MG capsule  aspirin 81 MG tablet  atorvastatin (LIPITOR) 20 MG tablet  cefdinir (OMNICEF) 300 MG capsule  Magnesium 400 MG TABS  nitroGLYcerin (NITROSTAT) 0.4 MG sublingual tablet  Rosuvastatin Calcium (CRESTOR PO)  sotalol (BETAPACE) 80 MG tablet          Review of Systems   Constitutional: Negative for chills and fever.   Respiratory: Negative for shortness of breath.         Left rib pain   Cardiovascular: Negative for chest pain.   Gastrointestinal: Negative for abdominal pain, diarrhea, nausea and vomiting.   Genitourinary: Positive for dysuria. Negative for flank pain and hematuria.   Musculoskeletal: Positive for  myalgias.   All other systems reviewed and are negative.      Physical Exam   BP: 129/78  Pulse: 70  Temp: 98  F (36.7  C)  Resp: 16  Weight: 75 kg (165 lb 5.5 oz)  SpO2: 97 %      Physical Exam  Vitals and nursing note reviewed.   Constitutional:       Appearance: Normal appearance. He is not ill-appearing or toxic-appearing.   HENT:      Head: Atraumatic.   Eyes:      Pupils: Pupils are equal, round, and reactive to light.   Cardiovascular:      Rate and Rhythm: Normal rate and regular rhythm.      Heart sounds: Normal heart sounds.   Pulmonary:      Effort: Pulmonary effort is normal. No respiratory distress.      Breath sounds: Normal breath sounds. No wheezing, rhonchi or rales.   Chest:      Chest wall: Tenderness present. No mass or deformity.      Comments: Left lower rib tenderness to palpation.  No obvious deformity.  No bruising or redness.  Abdominal:      General: Bowel sounds are normal.      Palpations: Abdomen is soft.      Tenderness: There is no abdominal tenderness. There is no right CVA tenderness, left CVA tenderness, guarding or rebound.   Musculoskeletal:         General: No signs of injury. Normal range of motion.      Cervical back: Neck supple.   Skin:     General: Skin is warm and dry.      Findings: No bruising or erythema.   Neurological:      Mental Status: He is alert and oriented to person, place, and time.         ED Course                 Procedures              Results for orders placed or performed during the hospital encounter of 11/28/22 (from the past 24 hour(s))   UA with Microscopic reflex to Culture    Specimen: Urine, NOS   Result Value Ref Range    Color Urine Light Yellow Colorless, Straw, Light Yellow, Yellow    Appearance Urine Slightly Cloudy (A) Clear    Glucose Urine Negative Negative mg/dL    Bilirubin Urine Negative Negative    Ketones Urine Negative Negative mg/dL    Specific Gravity Urine 1.010 1.003 - 1.035    Blood Urine Small (A) Negative    pH Urine 5.5 4.7 -  8.0    Protein Albumin Urine Negative Negative mg/dL    Urobilinogen Urine Normal Normal, 2.0 mg/dL    Nitrite Urine Negative Negative    Leukocyte Esterase Urine Large (A) Negative    Bacteria Urine Moderate (A) None Seen /HPF    WBC Clumps Urine Present (A) None Seen /HPF    Mucus Urine Present (A) None Seen /LPF    RBC Urine 9 (H) <=2 /HPF    WBC Urine >182 (H) <=5 /HPF    Squamous Epithelials Urine 0 <=1 /HPF    Narrative    Urine Culture ordered based on laboratory criteria       Medications - No data to display    Assessments & Plan (with Medical Decision Making)     I have reviewed the nursing notes.    77-year-old male with a history of metastatic prostate cancer that presented with concerns of a urinary tract infection.  Patient does self caths.  Recently treated for urinary tract infection approximately 3 weeks ago.  His heart rate and rhythm are regular.  His respirations are nonlabored.  He has a soft and nontender abdomen on palpation.  No CVA tenderness appreciated.  He does have left lower rib tenderness to palpation.  No obvious deformity, bruising or swelling.    Urinalysis is positive for blood, leukocyte esterase with a WBC > 182.  Patient declined any imaging for his ribs as he notes it really does not bother him much.    Reviewed past urine cultures.  Patient will be treated with Omnicef.  Advised him to make sure he continues changing his catheters when he straight caths.  Follow-up with urologist if no improvement in symptoms.  Return to ED/UC for worsening or concerning symptoms.    I have reviewed the findings, diagnosis, plan and need for follow up with the patient.    This document was prepared using a combination of typing and voice generated software.  While every attempt was made for accuracy, spelling and grammatical errors may exist.    New Prescriptions    CEFDINIR (OMNICEF) 300 MG CAPSULE    Take 1 capsule (300 mg) by mouth 2 times daily for 10 days       Final diagnoses:   Acute  cystitis with hematuria   Rib pain on left side       11/28/2022   [facility]     Mpofu, Prudence, CNP  11/28/22 2326

## 2023-01-01 ENCOUNTER — APPOINTMENT (OUTPATIENT)
Dept: GENERAL RADIOLOGY | Facility: HOSPITAL | Age: 78
End: 2023-01-01
Attending: PHYSICIAN ASSISTANT
Payer: COMMERCIAL

## 2023-01-01 ENCOUNTER — HOSPITAL ENCOUNTER (EMERGENCY)
Facility: HOSPITAL | Age: 78
Discharge: HOME | End: 2023-08-07
Attending: PHYSICIAN ASSISTANT | Admitting: PHYSICIAN ASSISTANT
Payer: COMMERCIAL

## 2023-01-01 ENCOUNTER — HOSPITAL ENCOUNTER (EMERGENCY)
Facility: HOSPITAL | Age: 78
Discharge: HOME | End: 2023-01-26
Attending: PHYSICIAN ASSISTANT | Admitting: PHYSICIAN ASSISTANT
Payer: COMMERCIAL

## 2023-01-01 VITALS
SYSTOLIC BLOOD PRESSURE: 144 MMHG | TEMPERATURE: 97.6 F | DIASTOLIC BLOOD PRESSURE: 69 MMHG | HEART RATE: 54 BPM | WEIGHT: 146.3 LBS | RESPIRATION RATE: 18 BRPM | BODY MASS INDEX: 20.99 KG/M2 | OXYGEN SATURATION: 95 %

## 2023-01-01 VITALS
HEART RATE: 56 BPM | TEMPERATURE: 98.1 F | BODY MASS INDEX: 23.3 KG/M2 | DIASTOLIC BLOOD PRESSURE: 71 MMHG | WEIGHT: 162.4 LBS | SYSTOLIC BLOOD PRESSURE: 121 MMHG | RESPIRATION RATE: 16 BRPM | OXYGEN SATURATION: 96 %

## 2023-01-01 DIAGNOSIS — R33.9 URINARY RETENTION: ICD-10-CM

## 2023-01-01 DIAGNOSIS — N39.0 COMPLICATED UTI (URINARY TRACT INFECTION): ICD-10-CM

## 2023-01-01 DIAGNOSIS — C61 PROSTATE CANCER METASTATIC TO BONE (H): ICD-10-CM

## 2023-01-01 DIAGNOSIS — C79.51 PROSTATE CANCER METASTATIC TO BONE (H): ICD-10-CM

## 2023-01-01 DIAGNOSIS — R31.9 HEMATURIA: ICD-10-CM

## 2023-01-01 DIAGNOSIS — K59.00 CONSTIPATION: ICD-10-CM

## 2023-01-01 LAB
ALBUMIN SERPL BCG-MCNC: 3.7 G/DL (ref 3.5–5.2)
ALBUMIN UR-MCNC: 50 MG/DL
ALBUMIN UR-MCNC: ABNORMAL G/DL
ALP SERPL-CCNC: 200 U/L (ref 40–129)
ALT SERPL W P-5'-P-CCNC: 10 U/L (ref 0–70)
ANION GAP SERPL CALCULATED.3IONS-SCNC: 11 MMOL/L (ref 7–15)
ANION GAP SERPL CALCULATED.3IONS-SCNC: 8 MMOL/L (ref 7–15)
APPEARANCE UR: ABNORMAL
APPEARANCE UR: ABNORMAL
AST SERPL W P-5'-P-CCNC: 21 U/L (ref 0–45)
BACTERIA #/AREA URNS HPF: ABNORMAL /HPF
BACTERIA UR CULT: ABNORMAL
BACTERIA UR CULT: NO GROWTH
BASOPHILS # BLD AUTO: 0 10E3/UL (ref 0–0.2)
BASOPHILS # BLD AUTO: 0.1 10E3/UL (ref 0–0.2)
BASOPHILS NFR BLD AUTO: 0 %
BASOPHILS NFR BLD AUTO: 1 %
BILIRUB SERPL-MCNC: 0.2 MG/DL
BILIRUB UR QL STRIP: ABNORMAL
BILIRUB UR QL STRIP: NEGATIVE
BUN SERPL-MCNC: 21.1 MG/DL (ref 8–23)
BUN SERPL-MCNC: 26.1 MG/DL (ref 8–23)
CALCIUM SERPL-MCNC: 10.2 MG/DL (ref 8.8–10.2)
CALCIUM SERPL-MCNC: 10.5 MG/DL (ref 8.8–10.2)
CHLORIDE SERPL-SCNC: 102 MMOL/L (ref 98–107)
CHLORIDE SERPL-SCNC: 99 MMOL/L (ref 98–107)
COLOR UR AUTO: ABNORMAL
COLOR UR AUTO: YELLOW
CREAT SERPL-MCNC: 0.83 MG/DL (ref 0.67–1.17)
CREAT SERPL-MCNC: 0.93 MG/DL (ref 0.67–1.17)
DEPRECATED HCO3 PLAS-SCNC: 22 MMOL/L (ref 22–29)
DEPRECATED HCO3 PLAS-SCNC: 28 MMOL/L (ref 22–29)
EOSINOPHIL # BLD AUTO: 0.1 10E3/UL (ref 0–0.7)
EOSINOPHIL # BLD AUTO: 0.5 10E3/UL (ref 0–0.7)
EOSINOPHIL NFR BLD AUTO: 1 %
EOSINOPHIL NFR BLD AUTO: 5 %
ERYTHROCYTE [DISTWIDTH] IN BLOOD BY AUTOMATED COUNT: 14.3 % (ref 10–15)
ERYTHROCYTE [DISTWIDTH] IN BLOOD BY AUTOMATED COUNT: 17.9 % (ref 10–15)
GFR SERPL CREATININE-BSD FRML MDRD: 84 ML/MIN/1.73M2
GFR SERPL CREATININE-BSD FRML MDRD: 89 ML/MIN/1.73M2
GLUCOSE SERPL-MCNC: 106 MG/DL (ref 70–99)
GLUCOSE SERPL-MCNC: 119 MG/DL (ref 70–99)
GLUCOSE UR STRIP-MCNC: ABNORMAL MG/DL
GLUCOSE UR STRIP-MCNC: NEGATIVE MG/DL
HCT VFR BLD AUTO: 35 % (ref 40–53)
HCT VFR BLD AUTO: 35.2 % (ref 40–53)
HGB BLD-MCNC: 11 G/DL (ref 13.3–17.7)
HGB BLD-MCNC: 11.2 G/DL (ref 13.3–17.7)
HGB UR QL STRIP: ABNORMAL
HGB UR QL STRIP: ABNORMAL
HOLD SPECIMEN: NORMAL
HYALINE CASTS: 13 /LPF
IMM GRANULOCYTES # BLD: 0 10E3/UL
IMM GRANULOCYTES # BLD: 0.1 10E3/UL
IMM GRANULOCYTES NFR BLD: 0 %
IMM GRANULOCYTES NFR BLD: 1 %
KETONES UR STRIP-MCNC: ABNORMAL MG/DL
KETONES UR STRIP-MCNC: NEGATIVE MG/DL
LEUKOCYTE ESTERASE UR QL STRIP: ABNORMAL
LEUKOCYTE ESTERASE UR QL STRIP: ABNORMAL
LYMPHOCYTES # BLD AUTO: 1.5 10E3/UL (ref 0.8–5.3)
LYMPHOCYTES # BLD AUTO: 2.6 10E3/UL (ref 0.8–5.3)
LYMPHOCYTES NFR BLD AUTO: 16 %
LYMPHOCYTES NFR BLD AUTO: 23 %
MCH RBC QN AUTO: 26.1 PG (ref 26.5–33)
MCH RBC QN AUTO: 28.1 PG (ref 26.5–33)
MCHC RBC AUTO-ENTMCNC: 31.3 G/DL (ref 31.5–36.5)
MCHC RBC AUTO-ENTMCNC: 32 G/DL (ref 31.5–36.5)
MCV RBC AUTO: 84 FL (ref 78–100)
MCV RBC AUTO: 88 FL (ref 78–100)
MONOCYTES # BLD AUTO: 0.9 10E3/UL (ref 0–1.3)
MONOCYTES # BLD AUTO: 1.1 10E3/UL (ref 0–1.3)
MONOCYTES NFR BLD AUTO: 10 %
MONOCYTES NFR BLD AUTO: 9 %
MUCOUS THREADS #/AREA URNS LPF: PRESENT /LPF
MUCOUS THREADS #/AREA URNS LPF: PRESENT /LPF
NEUTROPHILS # BLD AUTO: 6.2 10E3/UL (ref 1.6–8.3)
NEUTROPHILS # BLD AUTO: 7.7 10E3/UL (ref 1.6–8.3)
NEUTROPHILS NFR BLD AUTO: 65 %
NEUTROPHILS NFR BLD AUTO: 69 %
NITRATE UR QL: ABNORMAL
NITRATE UR QL: POSITIVE
NRBC # BLD AUTO: 0 10E3/UL
NRBC # BLD AUTO: 0 10E3/UL
NRBC BLD AUTO-RTO: 0 /100
NRBC BLD AUTO-RTO: 0 /100
PH UR STRIP: 5.5 [PH] (ref 4.7–8)
PH UR STRIP: ABNORMAL [PH]
PLATELET # BLD AUTO: 317 10E3/UL (ref 150–450)
PLATELET # BLD AUTO: 319 10E3/UL (ref 150–450)
POTASSIUM SERPL-SCNC: 4 MMOL/L (ref 3.4–5.3)
POTASSIUM SERPL-SCNC: 4.5 MMOL/L (ref 3.4–5.3)
PROT SERPL-MCNC: 7.4 G/DL (ref 6.4–8.3)
RBC # BLD AUTO: 3.99 10E6/UL (ref 4.4–5.9)
RBC # BLD AUTO: 4.21 10E6/UL (ref 4.4–5.9)
RBC URINE: 129 /HPF
RBC URINE: >182 /HPF
SODIUM SERPL-SCNC: 132 MMOL/L (ref 136–145)
SODIUM SERPL-SCNC: 138 MMOL/L (ref 136–145)
SP GR UR STRIP: 1.02 (ref 1–1.03)
SP GR UR STRIP: ABNORMAL
SQUAMOUS EPITHELIAL: 11 /HPF
SQUAMOUS EPITHELIAL: <1 /HPF
UROBILINOGEN UR STRIP-MCNC: ABNORMAL MG/DL
UROBILINOGEN UR STRIP-MCNC: NORMAL MG/DL
WBC # BLD AUTO: 11.6 10E3/UL (ref 4–11)
WBC # BLD AUTO: 9.2 10E3/UL (ref 4–11)
WBC CLUMPS #/AREA URNS HPF: PRESENT /HPF
WBC URINE: >182 /HPF
WBC URINE: >182 /HPF

## 2023-01-01 PROCEDURE — 96365 THER/PROPH/DIAG IV INF INIT: CPT

## 2023-01-01 PROCEDURE — 87086 URINE CULTURE/COLONY COUNT: CPT | Performed by: PHYSICIAN ASSISTANT

## 2023-01-01 PROCEDURE — 99284 EMERGENCY DEPT VISIT MOD MDM: CPT | Mod: 25

## 2023-01-01 PROCEDURE — 81001 URINALYSIS AUTO W/SCOPE: CPT | Performed by: PHYSICIAN ASSISTANT

## 2023-01-01 PROCEDURE — 258N000003 HC RX IP 258 OP 636: Performed by: PHYSICIAN ASSISTANT

## 2023-01-01 PROCEDURE — 80048 BASIC METABOLIC PNL TOTAL CA: CPT | Performed by: PHYSICIAN ASSISTANT

## 2023-01-01 PROCEDURE — 36415 COLL VENOUS BLD VENIPUNCTURE: CPT | Performed by: PHYSICIAN ASSISTANT

## 2023-01-01 PROCEDURE — 85025 COMPLETE CBC W/AUTO DIFF WBC: CPT | Performed by: PHYSICIAN ASSISTANT

## 2023-01-01 PROCEDURE — 80053 COMPREHEN METABOLIC PANEL: CPT | Performed by: PHYSICIAN ASSISTANT

## 2023-01-01 PROCEDURE — 96360 HYDRATION IV INFUSION INIT: CPT | Mod: XU

## 2023-01-01 PROCEDURE — 99284 EMERGENCY DEPT VISIT MOD MDM: CPT | Performed by: PHYSICIAN ASSISTANT

## 2023-01-01 PROCEDURE — 51702 INSERT TEMP BLADDER CATH: CPT

## 2023-01-01 PROCEDURE — 250N000009 HC RX 250: Performed by: PHYSICIAN ASSISTANT

## 2023-01-01 PROCEDURE — 250N000011 HC RX IP 250 OP 636: Performed by: PHYSICIAN ASSISTANT

## 2023-01-01 PROCEDURE — 51798 US URINE CAPACITY MEASURE: CPT

## 2023-01-01 PROCEDURE — 74019 RADEX ABDOMEN 2 VIEWS: CPT

## 2023-01-01 PROCEDURE — 85018 HEMOGLOBIN: CPT | Performed by: PHYSICIAN ASSISTANT

## 2023-01-01 PROCEDURE — 96361 HYDRATE IV INFUSION ADD-ON: CPT

## 2023-01-01 RX ORDER — CEFTRIAXONE SODIUM 2 G/50ML
2 INJECTION, SOLUTION INTRAVENOUS ONCE
Status: COMPLETED | OUTPATIENT
Start: 2023-01-01 | End: 2023-01-01

## 2023-01-01 RX ORDER — HYDROMORPHONE HYDROCHLORIDE 2 MG/1
2 TABLET ORAL EVERY 4 HOURS PRN
COMMUNITY

## 2023-01-01 RX ORDER — LIDOCAINE HYDROCHLORIDE 20 MG/ML
JELLY TOPICAL ONCE
Status: COMPLETED | OUTPATIENT
Start: 2023-01-01 | End: 2023-01-01

## 2023-01-01 RX ORDER — SULFAMETHOXAZOLE/TRIMETHOPRIM 800-160 MG
1 TABLET ORAL 2 TIMES DAILY
Qty: 20 TABLET | Refills: 0 | Status: SHIPPED | OUTPATIENT
Start: 2023-01-01 | End: 2023-01-01

## 2023-01-01 RX ORDER — MORPHINE SULFATE 30 MG/1
30 CAPSULE, EXTENDED RELEASE ORAL EVERY 12 HOURS
COMMUNITY

## 2023-01-01 RX ORDER — SULFAMETHOXAZOLE/TRIMETHOPRIM 800-160 MG
1 TABLET ORAL 2 TIMES DAILY
Qty: 20 TABLET | Refills: 0 | Status: SHIPPED | OUTPATIENT
Start: 2023-01-01

## 2023-01-01 RX ADMIN — CEFTRIAXONE SODIUM 2 G: 2 INJECTION, SOLUTION INTRAVENOUS at 16:52

## 2023-01-01 RX ADMIN — LIDOCAINE HYDROCHLORIDE: 20 JELLY TOPICAL at 19:25

## 2023-01-01 RX ADMIN — SODIUM CHLORIDE 1000 ML: 9 INJECTION, SOLUTION INTRAVENOUS at 16:32

## 2023-01-01 RX ADMIN — SODIUM CHLORIDE 1000 ML: 9 INJECTION, SOLUTION INTRAVENOUS at 16:51

## 2023-01-01 ASSESSMENT — ENCOUNTER SYMPTOMS
FEVER: 0
UNEXPECTED WEIGHT CHANGE: 1
CONSTIPATION: 1
CHILLS: 0
DIZZINESS: 0
DIARRHEA: 0
DIFFICULTY URINATING: 1
NAUSEA: 0
ACTIVITY CHANGE: 1
SHORTNESS OF BREATH: 0
ABDOMINAL DISTENTION: 0
ABDOMINAL PAIN: 0
BLOOD IN STOOL: 0
ABDOMINAL PAIN: 0
LIGHT-HEADEDNESS: 0
HEMATURIA: 1
FATIGUE: 0
DYSURIA: 0
FEVER: 0
ABDOMINAL DISTENTION: 0

## 2023-01-01 ASSESSMENT — ACTIVITIES OF DAILY LIVING (ADL)
ADLS_ACUITY_SCORE: 35
ADLS_ACUITY_SCORE: 39

## 2023-01-26 NOTE — ED PROVIDER NOTES
History     Chief Complaint   Patient presents with     Constipation     Abdominal Pain     The history is provided by the patient.     Sarbjit Rivera is a 77 year old male who presented to the emergency department ambulatory for evaluation of lower urinary tract symptoms consisting of frequency, urgency, dysuria.  The patient has a known history of chronic urinary tract infections.  He used to perform intermittent catheterization.  Patient has been treated several times in the recent past for urinary tract infections.  He does report constipation without a normal bowel movement for at least 10 days.  He has known metastatic prostate cancer.  No vomiting.  No abdominal pain.  No fevers.    Allergies:  Allergies   Allergen Reactions     Acetaminophen Nausea     Oxybutynin Swelling     Tongue swelling     Pravastatin      Disturbed sleep pattern      Statins [Hmg-Coa-R Inhibitors]        Problem List:    Patient Active Problem List    Diagnosis Date Noted     ASCVD (arteriosclerotic cardiovascular disease) 01/08/2019     Priority: Medium     Hypertensive urgency 09/22/2018     Priority: Medium     Viral URI with cough 09/22/2018     Priority: Medium     Unstable angina (H) 09/21/2018     Priority: Medium     Paroxysmal atrial fibrillation (H) 05/03/2017     Priority: Medium     Coronary artery disease involving native coronary artery of native heart without angina pectoris 03/31/2017     Priority: Medium     Aneurysm, common iliac artery (H) 03/14/2017     Priority: Medium     Palpitations 03/14/2017     Priority: Medium     Vasovagal reaction 03/14/2017     Priority: Medium     Jaw pain 09/21/2014     Priority: Medium     Actinic keratosis 01/23/2014     Priority: Medium     Basal cell carcinoma of eyelid 01/23/2014     Priority: Medium     Formatting of this note might be different from the original.  IMO Update       Personal history of other malignant neoplasm of skin 01/23/2014     Priority: Medium      Anxiety 11/24/2012     Priority: Medium     Chest pain 11/24/2012     Priority: Medium     GERD (gastroesophageal reflux disease) 11/24/2012     Priority: Medium     HTN (hypertension) 11/24/2012     Priority: Medium     Pancreatic cyst 11/24/2012     Priority: Medium     Pain in joint, shoulder region 06/02/2009     Priority: Medium     Formatting of this note might be different from the original.  IMO Update 10/11          Past Medical History:    No past medical history on file.    Past Surgical History:    No past surgical history on file.    Family History:    No family history on file.    Social History:  Marital Status:  Single [1]  Social History     Tobacco Use     Smoking status: Some Days   Substance Use Topics     Alcohol use: Yes     Comment: occasional     Drug use: No        Medications:    amLODIPine-benazepril (LOTREL) 10-20 MG capsule  aspirin 81 MG tablet  atorvastatin (LIPITOR) 20 MG tablet  nitroGLYcerin (NITROSTAT) 0.4 MG sublingual tablet  sotalol (BETAPACE) 80 MG tablet  sulfamethoxazole-trimethoprim (BACTRIM DS) 800-160 MG tablet          Review of Systems   Constitutional: Negative for chills, fatigue and fever.   Gastrointestinal: Positive for constipation. Negative for abdominal distention and abdominal pain.   Genitourinary:        See HPI   Neurological: Negative for dizziness.       Physical Exam   BP: 125/73  Pulse: 62  Temp: 97.8  F (36.6  C)  Resp: 16  Weight: 73.7 kg (162 lb 6.4 oz)  SpO2: 97 %      Physical Exam  Vitals and nursing note reviewed.   Constitutional:       Appearance: Normal appearance.      Comments: This is a chronically ill-appearing 77-year-old pleasant and talkative male found semireclined on the exam bed in no distress.   Cardiovascular:      Rate and Rhythm: Regular rhythm.      Comments: Mild bradycardia  Pulmonary:      Effort: Pulmonary effort is normal.   Abdominal:      General: Abdomen is flat. There is no distension.      Palpations: Abdomen is soft.       Tenderness: There is no abdominal tenderness. There is no guarding.   Skin:     General: Skin is warm and dry.      Capillary Refill: Capillary refill takes less than 2 seconds.   Neurological:      General: No focal deficit present.      Mental Status: He is alert and oriented to person, place, and time.   Psychiatric:         Mood and Affect: Mood normal.         ED Course                 Procedures              Critical Care time:  none               Results for orders placed or performed during the hospital encounter of 01/26/23 (from the past 24 hour(s))   UA with Microscopic reflex to Culture    Specimen: Urine, NOS   Result Value Ref Range    Color Urine Yellow Colorless, Straw, Light Yellow, Yellow    Appearance Urine Cloudy (A) Clear    Glucose Urine Negative Negative mg/dL    Bilirubin Urine Negative Negative    Ketones Urine Negative Negative mg/dL    Specific Gravity Urine 1.020 1.003 - 1.035    Blood Urine Large (A) Negative    pH Urine 5.5 4.7 - 8.0    Protein Albumin Urine 50 (A) Negative mg/dL    Urobilinogen Urine Normal Normal, 2.0 mg/dL    Nitrite Urine Positive (A) Negative    Leukocyte Esterase Urine Large (A) Negative    Bacteria Urine Many (A) None Seen /HPF    WBC Clumps Urine Present (A) None Seen /HPF    Mucus Urine Present (A) None Seen /LPF    RBC Urine 129 (H) <=2 /HPF    WBC Urine >182 (H) <=5 /HPF    Squamous Epithelials Urine <1 <=1 /HPF    Hyaline Casts Urine 13 (H) <=2 /LPF    Narrative    Urine Culture ordered based on laboratory criteria   CBC with platelets differential    Narrative    The following orders were created for panel order CBC with platelets differential.  Procedure                               Abnormality         Status                     ---------                               -----------         ------                     CBC with platelets and d...[[id number]]  Abnormal            Final result                 Please view results for these tests on the  individual orders.   Basic metabolic panel   Result Value Ref Range    Sodium 138 136 - 145 mmol/L    Potassium 4.0 3.4 - 5.3 mmol/L    Chloride 102 98 - 107 mmol/L    Carbon Dioxide (CO2) 28 22 - 29 mmol/L    Anion Gap 8 7 - 15 mmol/L    Urea Nitrogen 21.1 8.0 - 23.0 mg/dL    Creatinine 0.93 0.67 - 1.17 mg/dL    Calcium 10.5 (H) 8.8 - 10.2 mg/dL    Glucose 119 (H) 70 - 99 mg/dL    GFR Estimate 84 >60 mL/min/1.73m2   CBC with platelets and differential   Result Value Ref Range    WBC Count 9.2 4.0 - 11.0 10e3/uL    RBC Count 3.99 (L) 4.40 - 5.90 10e6/uL    Hemoglobin 11.2 (L) 13.3 - 17.7 g/dL    Hematocrit 35.0 (L) 40.0 - 53.0 %    MCV 88 78 - 100 fL    MCH 28.1 26.5 - 33.0 pg    MCHC 32.0 31.5 - 36.5 g/dL    RDW 14.3 10.0 - 15.0 %    Platelet Count 319 150 - 450 10e3/uL    % Neutrophils 69 %    % Lymphocytes 16 %    % Monocytes 10 %    % Eosinophils 5 %    % Basophils 0 %    % Immature Granulocytes 0 %    NRBCs per 100 WBC 0 <1 /100    Absolute Neutrophils 6.2 1.6 - 8.3 10e3/uL    Absolute Lymphocytes 1.5 0.8 - 5.3 10e3/uL    Absolute Monocytes 0.9 0.0 - 1.3 10e3/uL    Absolute Eosinophils 0.5 0.0 - 0.7 10e3/uL    Absolute Basophils 0.0 0.0 - 0.2 10e3/uL    Absolute Immature Granulocytes 0.0 <=0.4 10e3/uL    Absolute NRBCs 0.0 10e3/uL   [place] Draw    Narrative    The following orders were created for panel order [place] Draw.  Procedure                               Abnormality         Status                     ---------                               -----------         ------                     Extra Blue Top Tube[[id number]]                              In process                 Extra Red Top Tube[[id number]]                               In process                 Extra Green Top (Lithium...[[id number]]                      In process                   Please view results for these tests on the individual orders.   XR Abdomen 2 Views    Narrative    XR ABDOMEN 2 VIEWS    HISTORY: 77 years Male Constipation and  abdominal pain    COMPARISON: None    TECHNIQUE: 2 views abdomen    FINDINGS: Moderate to large volume of stool. Vascular calcifications  are present. There is no evidence of bowel obstruction or free air.      Impression    IMPRESSION: Moderate to large volume of stool in the colon. No  evidence of bowel obstruction or free air.    SOLO TERRY MD         SYSTEM ID:  [id number]       Medications   0.9% sodium chloride BOLUS (1,000 mLs Intravenous New Bag 1/26/23 5861)   cefTRIAXone IN D5W (ROCEPHIN) intermittent infusion 2 g (2 g Intravenous New Bag 1/26/23 1652)       Assessments & Plan (with Medical Decision Making)   This is a pleasant and talkative 77-year-old male with a history of chronic urinary tract infections.  Previous UA and urine cultures were reviewed.  Previous CT scans were reviewed.  The patient does have known ureteral stones.  Unknown etiology of the chronic urinary tract infections.  Patient was treated with IV Rocephin as well as oral Bactrim for extended.  Given the complicated nature.  Close follow-up with urology.  Close follow-up with primary care.  At this time he has no high risk or red flag features.  Return here for any new or worsening symptoms.  He is able to urinate.  Bladder scan shows no evidence of obstructive process at 85.    This document was prepared using a combination of typing and voice generated software.  While every attempt was made for accuracy, spelling and grammatical errors may exist.    I have reviewed the nursing notes.    I have reviewed the findings, diagnosis, plan and need for follow up with the patient.       Medical Decision Making  The patient's presentation is strongly suggestive of a chronic illness mild to moderate exacerbation, progression, or side effect of treatment.    The patient's evaluation involved:  ordering and/or review of 3+ test(s) in this encounter (see separate area of note for details)  review of 3+ test result(s) ordered prior to  this encounter (see separate area of note for details)    The patient's management involved prescription drug management (including medications given in the ED) and a decision regarding hospitalization.        New Prescriptions    SULFAMETHOXAZOLE-TRIMETHOPRIM (BACTRIM DS) 800-160 MG TABLET    Take 1 tablet by mouth 2 times daily for 10 days       Final diagnoses:   Complicated UTI (urinary tract infection)   Constipation       1/26/2023   [facility]     Taj Norton PA-C  01/26/23 [id number]

## 2023-01-26 NOTE — DISCHARGE INSTRUCTIONS
Continue Bactrim as prescribed.    Start MiraLAX at 4 capfuls in a liquid of choice per day until you have grant bowel movements.  Also start taking Dulcolax 2 tablets daily.    Follow-up with urology and primary care within a week.    Return here for any new or worsening symptoms.

## 2023-01-26 NOTE — ED TRIAGE NOTES
"Pt presents with c/o constipation.  Pt states his last normal BM was 10-12 days ago.  Pt reports \"some\" abd pain.  Pt also reports painful urination and frequency.  Pt states he just finished abx for a UTI, but still has symptoms.       "

## 2023-01-26 NOTE — ED NOTES
Discharge instructions reviewed with patient.  Rx was changed to [email or web address].  No questions or concerns.  encouraged to return with new or worsening symptoms.  Copy of AVS in hand on discharge.

## 2023-08-07 NOTE — ED PROVIDER NOTES
History     Chief Complaint   Patient presents with    Urinary Retention    uti     On antibiotics for UTI     HPI  Sarbjit Rivera is a 78 year old male who presents to the emergency room with urinary retention.  He has a history of recent metastatic prostate cancer (diagnosed approximately a year ago), followed by hematology and urology at [facility], currently on chemo treatment.  He does straight cath occasionally, but recently has had minimal urine output with significant clots.  The last few months he has been treated multiple times for suspected UTIs, has had a variety of antibiotics.  Has any systemic symptoms including chills, fevers, sweats.    Allergies:  Allergies   Allergen Reactions    Gabapentin Other (See Comments)     hallucinations    Acetaminophen Nausea    Oxybutynin Swelling     Tongue swelling    Pravastatin      Disturbed sleep pattern     Statins [Statins]        Problem List:    Patient Active Problem List    Diagnosis Date Noted    ASCVD (arteriosclerotic cardiovascular disease) 01/08/2019     Priority: Medium    Hypertensive urgency 09/22/2018     Priority: Medium    Viral URI with cough 09/22/2018     Priority: Medium    Unstable angina (H) 09/21/2018     Priority: Medium    Paroxysmal atrial fibrillation (H) 05/03/2017     Priority: Medium    Coronary artery disease involving native coronary artery of native heart without angina pectoris 03/31/2017     Priority: Medium    Aneurysm, common iliac artery (H) 03/14/2017     Priority: Medium    Palpitations 03/14/2017     Priority: Medium    Vasovagal reaction 03/14/2017     Priority: Medium    Jaw pain 09/21/2014     Priority: Medium    Actinic keratosis 01/23/2014     Priority: Medium    Basal cell carcinoma of eyelid 01/23/2014     Priority: Medium     Formatting of this note might be different from the original.  IMO Update      Personal history of other malignant neoplasm of skin 01/23/2014     Priority: Medium    Anxiety  11/24/2012     Priority: Medium    Chest pain 11/24/2012     Priority: Medium    GERD (gastroesophageal reflux disease) 11/24/2012     Priority: Medium    HTN (hypertension) 11/24/2012     Priority: Medium    Pancreatic cyst 11/24/2012     Priority: Medium    Pain in joint, shoulder region 06/02/2009     Priority: Medium     Formatting of this note might be different from the original.  IMO Update 10/11          Past Medical History:    No past medical history on file.    Past Surgical History:    No past surgical history on file.    Family History:    No family history on file.    Social History:  Marital Status:  Single [1]  Social History     Tobacco Use    Smoking status: Some Days   Substance Use Topics    Alcohol use: Yes     Comment: occasional    Drug use: No        Medications:    amoxicillin-clavulanate (AUGMENTIN) 875-125 MG tablet  atorvastatin (LIPITOR) 20 MG tablet  enzalutamide (XTANDI) 40 MG capsule  HYDROmorphone (DILAUDID) 2 MG tablet  morphine (PEARL) 30 MG 24 hr capsule  nitroGLYcerin (NITROSTAT) 0.4 MG sublingual tablet  sotalol (BETAPACE) 80 MG tablet  amLODIPine-benazepril (LOTREL) 10-20 MG capsule  aspirin 81 MG tablet  sulfamethoxazole-trimethoprim (BACTRIM DS) 800-160 MG tablet          Review of Systems   Constitutional:  Positive for activity change and unexpected weight change. Negative for fever.   Respiratory:  Negative for shortness of breath.    Cardiovascular:  Negative for chest pain.   Gastrointestinal:  Negative for abdominal distention, abdominal pain, blood in stool, diarrhea and nausea.   Genitourinary:  Positive for decreased urine volume, difficulty urinating and hematuria. Negative for dysuria and penile pain.        Clots in the urine   Neurological:  Negative for light-headedness.       Physical Exam   BP: 144/69  Pulse: 54  Temp: 97.6  F (36.4  C)  Resp: 18  Weight: 66.4 kg (146 lb 4.8 oz)  SpO2: 95 %      Physical Exam  Vitals and nursing note reviewed.     Performed  with patient's daughter present  Nontoxic  NAD, appears comfortable  Cachectic  Awake, alert, oriented x3, appropriate interaction behavior  Nonlabored respiratory effort, clear to auscultation bilaterally, no wheezing or stridor  Hemodynamically stable, SBP 140s, heart rate 50-60s  Abdomen soft, nontender all 4 quadrants  Skin warm, well-perfused  No extremity edema    ED Course            ED Course as of 08/07/23 1926   Mon Aug 07, 2023   1730 Bladder scan per RN, 150 mL only.  We will give 1 L fluid bolus to see if patient can self void         Results for orders placed or performed during the hospital encounter of 08/07/23 (from the past 24 hour(s))   [place] Draw *Canceled*    Narrative    The following orders were created for panel order [place] Draw.  Procedure                               Abnormality         Status                     ---------                               -----------         ------                       Please view results for these tests on the individual orders.   UA with Microscopic reflex to Culture    Specimen: Urine, Clean Catch   Result Value Ref Range    Color Urine Dark Red (A) Colorless, Straw, Light Yellow, Yellow    Appearance Urine Bloody (A) Clear    Glucose Urine      Bilirubin Urine      Ketones Urine      Specific Gravity Urine      Blood Urine      pH Urine      Protein Albumin Urine      Urobilinogen Urine      Nitrite Urine      Leukocyte Esterase Urine      Mucus Urine Present (A) None Seen /LPF    RBC Urine >182 (H) <=2 /HPF    WBC Urine >182 (H) <=5 /HPF    Squamous Epithelials Urine 11 (H) <=1 /HPF    Narrative    Urine Culture ordered based on laboratory criteria   Comprehensive metabolic panel   Result Value Ref Range    Sodium 132 (L) 136 - 145 mmol/L    Potassium 4.5 3.4 - 5.3 mmol/L    Chloride 99 98 - 107 mmol/L    Carbon Dioxide (CO2) 22 22 - 29 mmol/L    Anion Gap 11 7 - 15 mmol/L    Urea Nitrogen 26.1 (H) 8.0 - 23.0 mg/dL    Creatinine 0.83 0.67 - 1.17  mg/dL    Calcium 10.2 8.8 - 10.2 mg/dL    Glucose 106 (H) 70 - 99 mg/dL    Alkaline Phosphatase 200 (H) 40 - 129 U/L    AST 21 0 - 45 U/L    ALT 10 0 - 70 U/L    Protein Total 7.4 6.4 - 8.3 g/dL    Albumin 3.7 3.5 - 5.2 g/dL    Bilirubin Total 0.2 <=1.2 mg/dL    GFR Estimate 89 >60 mL/min/1.73m2   CBC with platelets differential    Narrative    The following orders were created for panel order CBC with platelets differential.  Procedure                               Abnormality         Status                     ---------                               -----------         ------                     CBC with platelets and d...[[id number]]  Abnormal            Final result                 Please view results for these tests on the individual orders.   CBC with platelets and differential   Result Value Ref Range    WBC Count 11.6 (H) 4.0 - 11.0 10e3/uL    RBC Count 4.21 (L) 4.40 - 5.90 10e6/uL    Hemoglobin 11.0 (L) 13.3 - 17.7 g/dL    Hematocrit 35.2 (L) 40.0 - 53.0 %    MCV 84 78 - 100 fL    MCH 26.1 (L) 26.5 - 33.0 pg    MCHC 31.3 (L) 31.5 - 36.5 g/dL    RDW 17.9 (H) 10.0 - 15.0 %    Platelet Count 317 150 - 450 10e3/uL    % Neutrophils 65 %    % Lymphocytes 23 %    % Monocytes 9 %    % Eosinophils 1 %    % Basophils 1 %    % Immature Granulocytes 1 %    NRBCs per 100 WBC 0 <1 /100    Absolute Neutrophils 7.7 1.6 - 8.3 10e3/uL    Absolute Lymphocytes 2.6 0.8 - 5.3 10e3/uL    Absolute Monocytes 1.1 0.0 - 1.3 10e3/uL    Absolute Eosinophils 0.1 0.0 - 0.7 10e3/uL    Absolute Basophils 0.1 0.0 - 0.2 10e3/uL    Absolute Immature Granulocytes 0.1 <=0.4 10e3/uL    Absolute NRBCs 0.0 10e3/uL   Extra Tube    Narrative    The following orders were created for panel order Extra Tube.  Procedure                               Abnormality         Status                     ---------                               -----------         ------                     Extra Blue Top Tube[[id number]]                              Final result                Extra Red Top Tube[[id number]]                               Final result               Extra Heparinized Syringe[[id number]]                        Final result                 Please view results for these tests on the individual orders.   Extra Blue Top Tube   Result Value Ref Range    Hold Specimen JIC    Extra Red Top Tube   Result Value Ref Range    Hold Specimen JIC    Extra Heparinized Syringe   Result Value Ref Range    Hold Specimen JIC        Medications   lidocaine (XYLOCAINE) 2 % external gel (has no administration in time range)   0.9% sodium chloride BOLUS (0 mLs Intravenous Stopped 8/7/23 3062)       Assessments & Plan (with Medical Decision Making)   Sarbjit is a 78-year-old male who presents to the emergency room due to urinary retention, hematuria and significant clots, attributed to his recent diagnosis of prostate cancer.    -Nontoxic, no acute distress  -Recent antibiotics due to multiple diagnoses of UTIs, so will not treat any further renal minimal leukocytosis and elevated WBCs of urine    -No signs of renal failure or [place]  -Initial bladder scan of 150 mL, increased to nearly 200 but still unable to void after 1 L fluid bolus  -Due to urinary retention and significant clots, opted to place three-way Hammer for irrigation flushing    -Patient already has a scheduled appointment with urology on Friday, 8/11/2023, defer Hammer management to his urology provider at that time    I have reviewed the nursing notes.    I have reviewed the findings, diagnosis, plan and need for follow up with the patient.    Disposition: Okay to discharge home.  Patient and his daughter agree with plan.      New Prescriptions    No medications on file       Final diagnoses:   Hematuria   Prostate cancer metastatic to bone (H)   Urinary retention       8/7/2023   [facility]       Joseluis Prieto PA-C  08/07/23 [id number]

## 2023-08-07 NOTE — ED TRIAGE NOTES
"\"Has metastatic Prostate cancer.  I am being treated for a UTI and am still on antibiotics.  I pee very little and it is bloody and blood clots.  I self cath at home when I can not pee regularly, but today the self cath did not work.\"          "

## 2023-08-07 NOTE — ED NOTES
Face to face report given with opportunity to observe patient.    Report given to VINNIE Mathew RN   8/7/2023  6:59 PM

## 2023-08-08 NOTE — DISCHARGE INSTRUCTIONS
Gallagher cares per Nurse instruction    Flush 2-3 times daily to deal with clots.     If you notice significant decrease of urine output or no longer filling gallagher bag then contact your Urology Provider or return to the ER.    Follow-up with Urology as scheduled on Friday 8/11